# Patient Record
(demographics unavailable — no encounter records)

---

## 2024-12-30 NOTE — DATA REVIEWED
[de-identified] : MRI LUMBAR SPINE 2/22/2022 FINDINGS: ALIGNMENT: The usual lordosis is maintained. VERTEBRAL BODIES:The vertebral bodies are normal in height. MARROW SIGNAL:The vertebral bodies exhibit normal marrow signal. DISC SPACES:Disc desiccation and disc height loss at L3-L4 and L4-L5. L1-2:There is no disc herniation or stenosis. L2-3: Diffuse disc bulging and mild facet hypertrophy. There is flattening of the ventral margin of the thecal sac with mild narrowing of the spinal canal, slightly progressed from prior study. There is mild bilateral foraminal stenosis. L3-4: Facet hypertrophy. Disc desiccation with disc height loss and diffuse disc bulging deforming the ventral thecal sac and mild to moderately narrowing the spinal canal. There is mild to moderate left foraminal stenosis. Superimposed right foraminal/far lateral disc protrusion contributes to moderate right foraminal stenosis. This appears similar prior study. L4-5: Facet hypertrophy. Disc desiccation with disc height loss and diffuse disc bulging resulting in moderate spinal canal stenosis. Superimposed left foraminal/far lateral disc protrusion present effacing the exiting left L4 nerve root with moderate to severe left foraminal stenosis. Right neural foramen mildly narrowed. Degenerative changes appear slightly progressed compared to prior study. L5-S1: Facet hypertrophy. No significant stenosis. The region of the conus medullaris lies at the T12 level and is unremarkable. IMPRESSION: Multilevel disc and facet degenerative changes resulting in spinal canal stenosis at L2-L3, L3-L4, and greatest at L4-L5. Left foraminal/far lateral disc protrusion at L4-L5 effacing the exiting left L4 nerve root. Moderate right foraminal stenosis at L3-L4 with partial effacement of the exiting right L3. The changes are similar to the previous examination but appear mildly progressed at L4-L5 and L2-L3 as detailed above.

## 2024-12-30 NOTE — PHYSICAL EXAM
[FreeTextEntry1] : NEURO: Mental Status Oriented to person, place, time, and situation Speech is clear, fluent, and spontaneous. Comprehension and memory intact.   Cranial Nerves Visual fields full to confrontation Pupils equal, round, reactive to light. Extraocular movements intact. No nystagmus or ptosis. Facial sensation intact and symmetric in V1, V2, V3 Facial movement intact and symmetric Uvula midline, soft palate elevates normally Bilateral shoulder shrug intact Tongue midline, no tongue bite sign or deviation on protrusion   Motor Tone and bulk intact Shoulder abduction: 5/5 b/l Elbow flexion/extension: 5/5 bl Hand : 5/5 b/l Hip flexion/extension: 5/5 b/l Knee flexion/extension: 5/5 b/l Dorsiflexion/plantar flexion: 5/5 b/l No pronator drift   Sensation Light touch +dysesthesia L hand with +Phalen's Decreased temperature sensation L dorsum foot.   Deep Tendon Reflexes 1+ throughout.   Coordination Normal finger to nose bilaterally No past pointing  Gait Normal stance, stride, and pivot turn Tandem walk intact Heel and toe gait intact. Mild sway with Romberg   GEN: awake, alert, interactive, no acute distress EYES: sclera white, conjunctiva clear, no redness or discharge ENT: normal appearing outer ears, hearing grossly intact EXT: moving all extremities spontaneously SKIN: warm, dry

## 2024-12-30 NOTE — HISTORY OF PRESENT ILLNESS
[FreeTextEntry1] : Rhys Soto is a 46 year old male with medical history significant for HTN, HLD, asthma presenting today for initial neurological evaluation, accompanied by SO.  He endorses numbness and tingling in extremities.  For upper extremities, has been going on for years, worse on the left arm than the right.  He describes it as a numb sensation in the hands, constantly, and intermittently going from the shoulders to the hands.  There would also be a tingling, throbbing and pulsing sensations.  He endorses chronic neck and back pain, he has been seeing pain management Sathya Floyd and had spinal injections which were helpful but wore off.  He also had chiropractor, and TENS treatment.  He also gets intermittent spasms of the muscles in his legs.  Sometimes his right leg gets sciatica pain in the muscles lock up.  He works in construction.  He also has significant daytime fatigue, he was diagnosed with LISA 5 years ago, however does not use the CPAP consistently.  No saddle anesthesia or incontinence.  The symptoms will keep him up at night as well. He had EMg/NCS 4 years ago and was told is normal, however his sx progressed over time. No h/o DM.   MRI LUMBAR SPINE 2/22/22 - Multilevel disc and facet degenerative changes resulting in spinal canal stenosis at L2-L3, L3-L4, and greatest at L4-L5. Left foraminal/far lateral disc protrusion at L4-L5 effacing the exiting left L4 nerve root. Moderate right foraminal stenosis at L3-L4 with partial effacement of the exiting right L3. The changes are similar to the previous examination but appear mildly progressed at L4-L5 and L2-L3. XR CSPINE 7/21/17 - Straightening of normal lordotic cervical spine possibly due to spasm is noted.

## 2024-12-30 NOTE — ASSESSMENT
[FreeTextEntry1] : 46 year old male with medical history significant for HTN, HLD, asthma presenting today for initial neurological evaluation, accompanied by SO. Endorsing paresthesia in all extremities L>R and chronic back pain. Physical exam with abnormal sensation to LT L hand and temperature L dorsal foot MRI LUMBAR SPINE 2/22/22 - Multilevel disc and facet degenerative changes resulting in spinal canal stenosis at L2-L3, L3-L4, and greatest at L4-L5. Left foraminal/far lateral disc protrusion at L4-L5 effacing the exiting left L4 nerve root. Moderate right foraminal stenosis at L3-L4 with partial effacement of the exiting right L3. The changes are similar to the previous examination but appear mildly progressed at L4-L5 and L2-L3. XR CSPINE 7/21/17 - Straightening of normal lordotic cervical spine possibly due to spasm is noted.  Sx likely radicular in nature, will perform further work up  Recommendations: - EMG/NCS of BUE and BLE to evaluate for conduction abnormalities - physical therapy referral for chronic neck and back pain - MRI C- and L-spine to evaluate for nerve impingement and other structural abnormalities - start  mg 1 cap nightly, increase by 1 cap per week until total 1 cap TID for symptom relief - lifestyle modifications discussed - gentle stretching and massage as needed, avoid strenuous activity or provocative maneuvers, avoid prolonged positioning  Patient to return to office in 4-6 weeks, or sooner if needed. Patient understands to seek urgent medical evaluation for any new or worsening symptoms.

## 2025-03-13 NOTE — PHYSICAL EXAM
[FreeTextEntry1] : NEURO: Mental Status Oriented to person, place, time, and situation Speech is clear, fluent, and spontaneous. Comprehension and memory intact.  Cranial Nerves Visual fields full to confrontation Pupils equal, round, reactive to light. Extraocular movements intact. No nystagmus or ptosis. Facial sensation intact and symmetric in V1, V2, V3 Facial movement intact and symmetric Uvula midline, soft palate elevates normally Bilateral shoulder shrug intact Tongue midline, no tongue bite sign or deviation on protrusion  Motor Tone and bulk intact Shoulder abduction: 5/5 b/l Elbow flexion/extension: 5/5 bl Hand : 5/5 b/l Hip flexion/extension: 5/5 b/l Knee flexion/extension: 5/5 b/l Dorsiflexion/plantar flexion: 5/5 b/l No pronator drift  Sensation Light touch +dysesthesia L hand. BLE intact to LT, temperature, vibration, symmetric.  +Phalen's BUE wrist.   Deep Tendon Reflexes 1+ throughout.  Coordination Normal finger to nose bilaterally No past pointing  Gait Normal stance, stride, and pivot turn Mild sway with Romberg  GEN: awake, alert, interactive, no acute distress EYES: sclera white, conjunctiva clear, no redness or discharge ENT: normal appearing outer ears, hearing grossly intact EXT: moving all extremities spontaneously SKIN: warm, dry.

## 2025-03-13 NOTE — HISTORY OF PRESENT ILLNESS
[FreeTextEntry1] : Rhys Soto is a 46 year old male with medical history significant for HTN, HLD, asthma presenting today for f/u.  MRI CSPINE 1/29/25 - Straightening of the cervical lordosis. At C3-4: Right-sided uncinate hypertrophy and facet arthrosis moderately narrow the right neural foramen. At C4-5: Central disc herniation impinging upon the ventral thecal sac. At C6-7: Disc bulge without canal stenosis or neural foraminal narrowing MRI LSPINE 1/29/25 - Congenital narrowing of the spinal canal. Modic type 1 endplate changes at L2-L3, L3-L4 and L4-L5. At L2-3: Diffuse disc bulge with a superimposed left foraminal disc herniation with bilateral facet arthrosis resulting in moderate spinal canal stenosis and moderate left and mild right-sided neural foraminal narrowing. At L3-4: Diffuse disc bulge with a superimposed central disc herniation and bilateral facet arthrosis resulting in moderate spinal canal stenosis and moderate bilateral neural foraminal narrowing. At L4-5: Circumferential disc bulge with a superimposed left foraminal disc herniation with bilateral facet arthrosis resulting in moderate spinal canal stenosis and mild left-sided neural foraminal narrowing. At L5-S1: Disc bulge mildly narrows the neural foramen. 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 Presence of ulnar to median motor Trent Canneiu anastomosis noted in L palm. Mild sensorimotor demyelinating neuropathic process affecting R median nerve at or about the wrist. No evidence of proximal nerve, plexus or nerve root pathology, polyneuropathy, myopathy, or motor neuron pathology.   He remains with same symptoms, numbness and tingling in extremities, still pending nerve studies of lower extremities.  He was able to titrate up on gabapentin, now taking 300 mg 1 capsule 3 times a day, no significant effects.  He finds it helps with symptom intensity but not completely.  He is planning to get another steroid injection with his pain management Dr. Floyd.  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  He is right-hand dominant.  He denies any history or family history of neurological or other nerve disease.  __________  initial visit 12/30/24: He endorses numbness and tingling in extremities. For upper extremities, has been going on for years, worse on the left arm than the right. He describes it as a numb sensation in the hands, constantly, and intermittently going from the shoulders to the hands. There would also be a tingling, throbbing and pulsing sensations. He endorses chronic neck and back pain, he has been seeing pain management Sathya Floyd and had spinal injections which were helpful but wore off. He also had chiropractor, and TENS treatment. He also gets intermittent spasms of the muscles in his legs. Sometimes his right leg gets sciatica pain in the muscles lock up. He works in construction. He also has significant daytime fatigue, he was diagnosed with LISA 5 years ago, however does not use the CPAP consistently. No saddle anesthesia or incontinence. The symptoms will keep him up at night as well. He had EMg/NCS 4 years ago and was told is normal, however his sx progressed over time. No h/o DM.  MRI LUMBAR SPINE 2/22/22 - Multilevel disc and facet degenerative changes resulting in spinal canal stenosis at L2-L3, L3-L4, and greatest at L4-L5. Left foraminal/far lateral disc protrusion at L4-L5 effacing the exiting left L4 nerve root. Moderate right foraminal stenosis at L3-L4 with partial effacement of the exiting right L3. The changes are similar to the previous examination but appear mildly progressed at L4-L5 and L2-L3. XR CSPINE 7/21/17 - Straightening of normal lordotic cervical spine possibly due to spasm is noted.

## 2025-03-13 NOTE — ASSESSMENT
[FreeTextEntry1] : 46 year old male with medical history significant for HTN, HLD, asthma presenting today for f/u.  Endorsing paresthesia in all extremities L>R and chronic back pain. Physical exam with abnormal sensation to LT L hand, + Phalen's b/l wrist. BLE symmetric to LT, vibration, temperature.   MRI CSPINE 1/29/25 - Straightening of the cervical lordosis. At C3-4: Right-sided uncinate hypertrophy and facet arthrosis moderately narrow the right neural foramen. At C4-5: Central disc herniation impinging upon the ventral thecal sac. At C6-7: Disc bulge without canal stenosis or neural foraminal narrowing MRI LSPINE 1/29/25 - Congenital narrowing of the spinal canal. Modic type 1 endplate changes at L2-L3, L3-L4 and L4-L5. At L2-3: Diffuse disc bulge with a superimposed left foraminal disc herniation with bilateral facet arthrosis resulting in moderate spinal canal stenosis and moderate left and mild right-sided neural foraminal narrowing. At L3-4: Diffuse disc bulge with a superimposed central disc herniation and bilateral facet arthrosis resulting in moderate spinal canal stenosis and moderate bilateral neural foraminal narrowing. At L4-5: Circumferential disc bulge with a superimposed left foraminal disc herniation with bilateral facet arthrosis resulting in moderate spinal canal stenosis and mild left-sided neural foraminal narrowing. At L5-S1: Disc bulge mildly narrows the neural foramen. 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 Presence of ulnar to median motor Trent Canneiu anastomosis noted in L palm. Mild sensorimotor demyelinating neuropathic process affecting R median nerve at or about the wrist. No evidence of proximal nerve, plexus or nerve root pathology, polyneuropathy, myopathy, or motor neuron pathology.  Case discussed with supervising physician Dr. Ga - entrapment/compression neuropathies can have demyelinating features however we need complete workup of BLE as well to ensure no diffuse demyelinating neuropathic process.   Recommendations: - EMG/NCS of BLE to evaluate for conduction abnormalities - ortho hand surgery referral placed to CTS L>R - encouraged pt to pursue physical therapy for chronic neck and back pain - continue  mg 1 cap TID - he may increase by 1 cap weekly as tolerated until total 2 cap TID. side effects discussed - lifestyle modifications discussed - gentle stretching and massage as needed, avoid strenuous activity or provocative maneuvers, avoid prolonged positioning  Patient to return to office in 4-6 weeks, or sooner if needed. Patient understands to seek urgent medical evaluation for any new or worsening symptoms.